# Patient Record
Sex: MALE | Race: WHITE
[De-identification: names, ages, dates, MRNs, and addresses within clinical notes are randomized per-mention and may not be internally consistent; named-entity substitution may affect disease eponyms.]

---

## 2020-03-13 ENCOUNTER — HOSPITAL ENCOUNTER (OUTPATIENT)
Dept: HOSPITAL 38 - CC.SDS | Age: 60
End: 2020-03-13
Attending: FAMILY MEDICINE
Payer: COMMERCIAL

## 2020-03-13 VITALS — DIASTOLIC BLOOD PRESSURE: 89 MMHG | SYSTOLIC BLOOD PRESSURE: 157 MMHG | HEART RATE: 57 BPM

## 2020-03-13 DIAGNOSIS — N13.8: ICD-10-CM

## 2020-03-13 DIAGNOSIS — Z98.890: ICD-10-CM

## 2020-03-13 DIAGNOSIS — I10: ICD-10-CM

## 2020-03-13 DIAGNOSIS — N40.1: ICD-10-CM

## 2020-03-13 DIAGNOSIS — Z79.899: ICD-10-CM

## 2020-03-13 DIAGNOSIS — Z12.11: Primary | ICD-10-CM

## 2020-03-13 DIAGNOSIS — K21.9: ICD-10-CM

## 2020-03-13 PROCEDURE — G0121 COLON CA SCRN NOT HI RSK IND: HCPCS

## 2020-03-13 NOTE — OR
DATE OF OPERATION:  03/13/2020

 

PREOPERATIVE DIAGNOSIS:  SCREENING COLONOSCOPY.

 

POSTOPERATIVE DIAGNOSIS:  SCREENING COLONOSCOPY.

 

SURGEON:  Capo Arellano MD

 

PROCEDURE:  FULL-LENGTH COLONOSCOPY.

 

ANESTHESIA:  MAC.

 

COMPLICATIONS:  None.

 

SPECIMEN:  None.

 

FINDINGS:  Normal full-length colonoscopy.

 

RECOMMENDATIONS:  Followup colonoscopy every 10 years.

 

INDICATIONS:  The patient was in for routine physical.  It has been 10 years

since his last scope.  We recommended a screening procedure.

 

DESCRIPTION OF PROCEDURE:  The patient was prepped and draped, placed in the

left lateral decubitus position.  A lubricated Olympus colonoscope was inserted

and easily advanced to the cecum.  Direct visualization of the ileocecal valve

and appendiceal orifice was accomplished.  The bowel prep was adequate.  Upon

withdrawal of the scope throughout the entire length of the colon, I could find

no signs of any polyps, masses, ulceration, or bleeding sites.  No vascular

abnormalities or signs of colitis.  There were no diverticula.  The rectal vault

was unremarkable.  Retroflexion showed no perianal lesions.  Air was suctioned,

scope was removed without complication.

 

KRYSTINA/RUSH

DD:  03/13/2020 12:09:12

DT:  03/13/2020 12:52:10

Job #:  [id number]